# Patient Record
Sex: FEMALE | Race: OTHER | NOT HISPANIC OR LATINO | URBAN - METROPOLITAN AREA
[De-identification: names, ages, dates, MRNs, and addresses within clinical notes are randomized per-mention and may not be internally consistent; named-entity substitution may affect disease eponyms.]

---
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
## 2021-05-28 ENCOUNTER — APPOINTMENT (OUTPATIENT)
Dept: NEUROLOGY | Facility: CLINIC | Age: 21
End: 2021-05-28
Payer: COMMERCIAL

## 2021-05-28 VITALS
WEIGHT: 115 LBS | HEART RATE: 68 BPM | HEIGHT: 62 IN | DIASTOLIC BLOOD PRESSURE: 60 MMHG | SYSTOLIC BLOOD PRESSURE: 90 MMHG | BODY MASS INDEX: 21.16 KG/M2

## 2021-05-28 DIAGNOSIS — G43.709 CHRONIC MIGRAINE W/OUT AURA, NOT INTRACTABLE, W/OUT STATUS MIGRAINOSUS: ICD-10-CM

## 2021-05-28 DIAGNOSIS — Z82.0 FAMILY HISTORY OF EPILEPSY AND OTHER DISEASES OF THE NERVOUS SYSTEM: ICD-10-CM

## 2021-05-28 PROCEDURE — 99072 ADDL SUPL MATRL&STAF TM PHE: CPT

## 2021-05-28 PROCEDURE — 99215 OFFICE O/P EST HI 40 MIN: CPT

## 2021-05-28 NOTE — ASSESSMENT
[FreeTextEntry1] : Impression: Today's neurological exam is normal.  Cory's history is most consistent with chronic migraine.  When she was seen in September 2020 she was having brief binocular visual episodes suggesting migraine aura.  Those visual episodes have not recurred. \par \par Recommendations: Trial of naproxen in the form of Aleve 2 tablets twice daily for 5 days in attempt to break the headache cycle.  Could then take Aleve 1 to 2 tablets twice daily as needed for recurrent headache.  MRI of the brain noncontrast.  She was given literature regarding potential migraine triggers.  I did suggest she review the specific birth control medication she is receiving regarding  risk of migraine.  I would prefer to avoid daily preventive medication at this juncture.  Reassurance was given to the patient that today's exam is normal.\par

## 2021-05-28 NOTE — HISTORY OF PRESENT ILLNESS
[FreeTextEntry1] : Cory is seen for an office visit.  She was last seen on 9/17/2020 when she presented with chronic headache but also brief episodes of binocular visual loss.  Visual episodes have resolved.  Never did have the brain MRI which I had suggested at the time of the last visit.  She is now describing frequent bilateral predominantly frontotemporal headaches which occur on the average of 3 times a week for at least the last 1 year.  She takes over-the-counter analgesics specifically ibuprofen and does get temporary relief.  The headaches are more frequent with activity and there is relief with rest.  They are also more frequent during the time of menses.  She does take birth control medication.  She denies any visual aura or any focal neurological complaints.  She does have nausea with the headache.

## 2021-05-28 NOTE — CONSULT LETTER
[Dear  ___] : Dear  [unfilled], [Consult Letter:] : I had the pleasure of evaluating your patient, [unfilled]. [Please see my note below.] : Please see my note below. [Consult Closing:] : Thank you very much for allowing me to participate in the care of this patient.  If you have any questions, please do not hesitate to contact me. [Sincerely,] : Sincerely, [FreeTextEntry3] : Cristóbal Melo MD\par

## 2021-09-08 ENCOUNTER — NON-APPOINTMENT (OUTPATIENT)
Age: 21
End: 2021-09-08

## 2021-09-10 ENCOUNTER — APPOINTMENT (OUTPATIENT)
Dept: ORTHOPEDIC SURGERY | Facility: CLINIC | Age: 21
End: 2021-09-10
Payer: COMMERCIAL

## 2021-09-10 DIAGNOSIS — M54.5 LOW BACK PAIN: ICD-10-CM

## 2021-09-10 PROCEDURE — 72100 X-RAY EXAM L-S SPINE 2/3 VWS: CPT

## 2021-09-10 PROCEDURE — 99204 OFFICE O/P NEW MOD 45 MIN: CPT

## 2021-09-10 PROCEDURE — 99072 ADDL SUPL MATRL&STAF TM PHE: CPT

## 2021-10-14 DIAGNOSIS — H53.30 UNSPECIFIED DISORDER OF BINOCULAR VISION: ICD-10-CM

## 2021-10-15 ENCOUNTER — OUTPATIENT (OUTPATIENT)
Dept: OUTPATIENT SERVICES | Facility: HOSPITAL | Age: 21
LOS: 1 days | End: 2021-10-15
Payer: COMMERCIAL

## 2021-10-15 ENCOUNTER — APPOINTMENT (OUTPATIENT)
Dept: MRI IMAGING | Facility: HOSPITAL | Age: 21
End: 2021-10-15
Payer: COMMERCIAL

## 2021-10-15 DIAGNOSIS — M62.830 MUSCLE SPASM OF BACK: ICD-10-CM

## 2021-10-15 DIAGNOSIS — S33.5XXA SPRAIN OF LIGAMENTS OF LUMBAR SPINE, INITIAL ENCOUNTER: ICD-10-CM

## 2021-10-15 PROCEDURE — 72148 MRI LUMBAR SPINE W/O DYE: CPT | Mod: 26

## 2021-10-15 PROCEDURE — 72148 MRI LUMBAR SPINE W/O DYE: CPT

## 2021-11-01 ENCOUNTER — APPOINTMENT (OUTPATIENT)
Dept: ORTHOPEDIC SURGERY | Facility: CLINIC | Age: 21
End: 2021-11-01
Payer: COMMERCIAL

## 2021-11-01 DIAGNOSIS — S39.012A STRAIN OF MUSCLE, FASCIA AND TENDON OF LOWER BACK, INITIAL ENCOUNTER: ICD-10-CM

## 2021-11-01 PROCEDURE — 99072 ADDL SUPL MATRL&STAF TM PHE: CPT

## 2021-11-01 PROCEDURE — 99214 OFFICE O/P EST MOD 30 MIN: CPT

## 2021-11-01 RX ORDER — DICLOFENAC SODIUM 75 MG/1
75 TABLET, DELAYED RELEASE ORAL
Qty: 30 | Refills: 1 | Status: ACTIVE | COMMUNITY
Start: 2021-11-01 | End: 1900-01-01

## 2021-11-19 ENCOUNTER — OUTPATIENT (OUTPATIENT)
Dept: OUTPATIENT SERVICES | Facility: HOSPITAL | Age: 21
LOS: 1 days | End: 2021-11-19
Payer: COMMERCIAL

## 2021-11-19 ENCOUNTER — APPOINTMENT (OUTPATIENT)
Dept: MRI IMAGING | Facility: HOSPITAL | Age: 21
End: 2021-11-19
Payer: COMMERCIAL

## 2021-11-19 DIAGNOSIS — H53.30 UNSPECIFIED DISORDER OF BINOCULAR VISION: ICD-10-CM

## 2021-11-19 PROCEDURE — 70551 MRI BRAIN STEM W/O DYE: CPT | Mod: 26

## 2021-11-19 PROCEDURE — 70551 MRI BRAIN STEM W/O DYE: CPT

## 2021-11-22 ENCOUNTER — NON-APPOINTMENT (OUTPATIENT)
Age: 21
End: 2021-11-22

## 2021-11-28 ENCOUNTER — TRANSCRIPTION ENCOUNTER (OUTPATIENT)
Age: 21
End: 2021-11-28

## 2021-12-17 ENCOUNTER — TRANSCRIPTION ENCOUNTER (OUTPATIENT)
Age: 21
End: 2021-12-17

## 2022-08-14 ENCOUNTER — NON-APPOINTMENT (OUTPATIENT)
Age: 22
End: 2022-08-14

## 2022-11-11 ENCOUNTER — NON-APPOINTMENT (OUTPATIENT)
Age: 22
End: 2022-11-11

## 2022-12-20 ENCOUNTER — NON-APPOINTMENT (OUTPATIENT)
Age: 22
End: 2022-12-20

## 2023-03-03 ENCOUNTER — NON-APPOINTMENT (OUTPATIENT)
Age: 23
End: 2023-03-03

## 2023-03-30 ENCOUNTER — NON-APPOINTMENT (OUTPATIENT)
Age: 23
End: 2023-03-30

## 2023-03-30 DIAGNOSIS — N76.0 ACUTE VAGINITIS: ICD-10-CM

## 2023-03-30 DIAGNOSIS — Z92.89 PERSONAL HISTORY OF OTHER MEDICAL TREATMENT: ICD-10-CM

## 2023-03-30 DIAGNOSIS — Z11.3 ENCOUNTER FOR SCREENING FOR INFECTIONS WITH A PREDOMINANTLY SEXUAL MODE OF TRANSMISSION: ICD-10-CM

## 2023-11-27 ENCOUNTER — NON-APPOINTMENT (OUTPATIENT)
Age: 23
End: 2023-11-27

## 2023-12-08 ENCOUNTER — EMERGENCY (EMERGENCY)
Facility: HOSPITAL | Age: 23
LOS: 1 days | Discharge: HOME | End: 2023-12-08
Attending: EMERGENCY MEDICINE | Admitting: EMERGENCY MEDICINE
Payer: COMMERCIAL

## 2023-12-08 VITALS
TEMPERATURE: 99 F | SYSTOLIC BLOOD PRESSURE: 115 MMHG | OXYGEN SATURATION: 98 % | HEIGHT: 63 IN | WEIGHT: 130.07 LBS | RESPIRATION RATE: 20 BRPM | HEART RATE: 81 BPM | DIASTOLIC BLOOD PRESSURE: 77 MMHG

## 2023-12-08 PROCEDURE — 99285 EMERGENCY DEPT VISIT HI MDM: CPT

## 2023-12-08 PROCEDURE — 99053 MED SERV 10PM-8AM 24 HR FAC: CPT

## 2023-12-08 NOTE — ED ADULT TRIAGE NOTE - CHIEF COMPLAINT QUOTE
pt axo3, c/o abdominal pain. pt was told GI doctor to come to ED for elevated ALT, pt states last ALT was in the 600s. pt states she tested negative for hepatitis.

## 2023-12-08 NOTE — ED ADULT TRIAGE NOTE - HEIGHT IN CM
Prenatal Breastfeeding Education Toolkit provided for patient to review, helping her to make an informed decision on a feeding choice for her baby. Questions directed to the provider.  Complete.  Raina Dao on 10/30/2019 at 4:26 PM     160.02

## 2023-12-09 VITALS
HEART RATE: 82 BPM | SYSTOLIC BLOOD PRESSURE: 118 MMHG | RESPIRATION RATE: 17 BRPM | OXYGEN SATURATION: 98 % | DIASTOLIC BLOOD PRESSURE: 74 MMHG

## 2023-12-09 LAB
ALBUMIN SERPL ELPH-MCNC: 3.2 G/DL — LOW (ref 3.3–5)
ALBUMIN SERPL ELPH-MCNC: 3.2 G/DL — LOW (ref 3.3–5)
ALP SERPL-CCNC: 107 U/L — SIGNIFICANT CHANGE UP (ref 40–120)
ALP SERPL-CCNC: 107 U/L — SIGNIFICANT CHANGE UP (ref 40–120)
ALT FLD-CCNC: 707 U/L — HIGH (ref 10–45)
ALT FLD-CCNC: 707 U/L — HIGH (ref 10–45)
ANION GAP SERPL CALC-SCNC: 9 MMOL/L — SIGNIFICANT CHANGE UP (ref 5–17)
ANION GAP SERPL CALC-SCNC: 9 MMOL/L — SIGNIFICANT CHANGE UP (ref 5–17)
AST SERPL-CCNC: 335 U/L — HIGH (ref 10–40)
AST SERPL-CCNC: 335 U/L — HIGH (ref 10–40)
BASOPHILS # BLD AUTO: 0 K/UL — SIGNIFICANT CHANGE UP (ref 0–0.2)
BASOPHILS # BLD AUTO: 0 K/UL — SIGNIFICANT CHANGE UP (ref 0–0.2)
BASOPHILS NFR BLD AUTO: 0 % — SIGNIFICANT CHANGE UP (ref 0–2)
BASOPHILS NFR BLD AUTO: 0 % — SIGNIFICANT CHANGE UP (ref 0–2)
BILIRUB SERPL-MCNC: 0.4 MG/DL — SIGNIFICANT CHANGE UP (ref 0.2–1.2)
BILIRUB SERPL-MCNC: 0.4 MG/DL — SIGNIFICANT CHANGE UP (ref 0.2–1.2)
BUN SERPL-MCNC: 10 MG/DL — SIGNIFICANT CHANGE UP (ref 7–23)
BUN SERPL-MCNC: 10 MG/DL — SIGNIFICANT CHANGE UP (ref 7–23)
CALCIUM SERPL-MCNC: 8.3 MG/DL — LOW (ref 8.4–10.5)
CALCIUM SERPL-MCNC: 8.3 MG/DL — LOW (ref 8.4–10.5)
CHLORIDE SERPL-SCNC: 104 MMOL/L — SIGNIFICANT CHANGE UP (ref 96–108)
CHLORIDE SERPL-SCNC: 104 MMOL/L — SIGNIFICANT CHANGE UP (ref 96–108)
CO2 SERPL-SCNC: 24 MMOL/L — SIGNIFICANT CHANGE UP (ref 22–31)
CO2 SERPL-SCNC: 24 MMOL/L — SIGNIFICANT CHANGE UP (ref 22–31)
CREAT SERPL-MCNC: 0.57 MG/DL — SIGNIFICANT CHANGE UP (ref 0.5–1.3)
CREAT SERPL-MCNC: 0.57 MG/DL — SIGNIFICANT CHANGE UP (ref 0.5–1.3)
EGFR: 132 ML/MIN/1.73M2 — SIGNIFICANT CHANGE UP
EGFR: 132 ML/MIN/1.73M2 — SIGNIFICANT CHANGE UP
EOSINOPHIL # BLD AUTO: 0.21 K/UL — SIGNIFICANT CHANGE UP (ref 0–0.5)
EOSINOPHIL # BLD AUTO: 0.21 K/UL — SIGNIFICANT CHANGE UP (ref 0–0.5)
EOSINOPHIL NFR BLD AUTO: 3 % — SIGNIFICANT CHANGE UP (ref 0–6)
EOSINOPHIL NFR BLD AUTO: 3 % — SIGNIFICANT CHANGE UP (ref 0–6)
GLUCOSE SERPL-MCNC: 93 MG/DL — SIGNIFICANT CHANGE UP (ref 70–99)
GLUCOSE SERPL-MCNC: 93 MG/DL — SIGNIFICANT CHANGE UP (ref 70–99)
HCT VFR BLD CALC: 37.4 % — SIGNIFICANT CHANGE UP (ref 34.5–45)
HCT VFR BLD CALC: 37.4 % — SIGNIFICANT CHANGE UP (ref 34.5–45)
HGB BLD-MCNC: 13 G/DL — SIGNIFICANT CHANGE UP (ref 11.5–15.5)
HGB BLD-MCNC: 13 G/DL — SIGNIFICANT CHANGE UP (ref 11.5–15.5)
LIDOCAIN IGE QN: 70 U/L — SIGNIFICANT CHANGE UP (ref 16–77)
LIDOCAIN IGE QN: 70 U/L — SIGNIFICANT CHANGE UP (ref 16–77)
LYMPHOCYTES # BLD AUTO: 4.11 K/UL — HIGH (ref 1–3.3)
LYMPHOCYTES # BLD AUTO: 4.11 K/UL — HIGH (ref 1–3.3)
LYMPHOCYTES # BLD AUTO: 59 % — HIGH (ref 13–44)
LYMPHOCYTES # BLD AUTO: 59 % — HIGH (ref 13–44)
MCHC RBC-ENTMCNC: 29.7 PG — SIGNIFICANT CHANGE UP (ref 27–34)
MCHC RBC-ENTMCNC: 29.7 PG — SIGNIFICANT CHANGE UP (ref 27–34)
MCHC RBC-ENTMCNC: 34.8 GM/DL — SIGNIFICANT CHANGE UP (ref 32–36)
MCHC RBC-ENTMCNC: 34.8 GM/DL — SIGNIFICANT CHANGE UP (ref 32–36)
MCV RBC AUTO: 85.6 FL — SIGNIFICANT CHANGE UP (ref 80–100)
MCV RBC AUTO: 85.6 FL — SIGNIFICANT CHANGE UP (ref 80–100)
MONOCYTES # BLD AUTO: 0.21 K/UL — SIGNIFICANT CHANGE UP (ref 0–0.9)
MONOCYTES # BLD AUTO: 0.21 K/UL — SIGNIFICANT CHANGE UP (ref 0–0.9)
MONOCYTES NFR BLD AUTO: 3 % — SIGNIFICANT CHANGE UP (ref 2–14)
MONOCYTES NFR BLD AUTO: 3 % — SIGNIFICANT CHANGE UP (ref 2–14)
NEUTROPHILS # BLD AUTO: 2.02 K/UL — SIGNIFICANT CHANGE UP (ref 1.8–7.4)
NEUTROPHILS # BLD AUTO: 2.02 K/UL — SIGNIFICANT CHANGE UP (ref 1.8–7.4)
NEUTROPHILS NFR BLD AUTO: 29 % — LOW (ref 43–77)
NEUTROPHILS NFR BLD AUTO: 29 % — LOW (ref 43–77)
PLATELET # BLD AUTO: 202 K/UL — SIGNIFICANT CHANGE UP (ref 150–400)
PLATELET # BLD AUTO: 202 K/UL — SIGNIFICANT CHANGE UP (ref 150–400)
POTASSIUM SERPL-MCNC: 3.7 MMOL/L — SIGNIFICANT CHANGE UP (ref 3.5–5.3)
POTASSIUM SERPL-MCNC: 3.7 MMOL/L — SIGNIFICANT CHANGE UP (ref 3.5–5.3)
POTASSIUM SERPL-SCNC: 3.7 MMOL/L — SIGNIFICANT CHANGE UP (ref 3.5–5.3)
POTASSIUM SERPL-SCNC: 3.7 MMOL/L — SIGNIFICANT CHANGE UP (ref 3.5–5.3)
PROT SERPL-MCNC: 7.2 G/DL — SIGNIFICANT CHANGE UP (ref 6–8.3)
PROT SERPL-MCNC: 7.2 G/DL — SIGNIFICANT CHANGE UP (ref 6–8.3)
RBC # BLD: 4.37 M/UL — SIGNIFICANT CHANGE UP (ref 3.8–5.2)
RBC # BLD: 4.37 M/UL — SIGNIFICANT CHANGE UP (ref 3.8–5.2)
RBC # FLD: 16 % — HIGH (ref 10.3–14.5)
RBC # FLD: 16 % — HIGH (ref 10.3–14.5)
SODIUM SERPL-SCNC: 137 MMOL/L — SIGNIFICANT CHANGE UP (ref 135–145)
SODIUM SERPL-SCNC: 137 MMOL/L — SIGNIFICANT CHANGE UP (ref 135–145)
WBC # BLD: 6.97 K/UL — SIGNIFICANT CHANGE UP (ref 3.8–10.5)
WBC # BLD: 6.97 K/UL — SIGNIFICANT CHANGE UP (ref 3.8–10.5)
WBC # FLD AUTO: 6.97 K/UL — SIGNIFICANT CHANGE UP (ref 3.8–10.5)
WBC # FLD AUTO: 6.97 K/UL — SIGNIFICANT CHANGE UP (ref 3.8–10.5)

## 2023-12-09 PROCEDURE — 85025 COMPLETE CBC W/AUTO DIFF WBC: CPT

## 2023-12-09 PROCEDURE — 36415 COLL VENOUS BLD VENIPUNCTURE: CPT

## 2023-12-09 PROCEDURE — 99283 EMERGENCY DEPT VISIT LOW MDM: CPT

## 2023-12-09 PROCEDURE — 81025 URINE PREGNANCY TEST: CPT

## 2023-12-09 PROCEDURE — 80053 COMPREHEN METABOLIC PANEL: CPT

## 2023-12-09 PROCEDURE — 83690 ASSAY OF LIPASE: CPT

## 2023-12-09 NOTE — ED ADULT NURSE NOTE - NSFALLHARMRISKINTERV_ED_ALL_ED
Communicate risk of Fall with Harm to all staff, patient, and family/Provide visual cue: red socks, yellow wristband, yellow gown, etc/Reinforce activity limits and safety measures with patient and family/Bed in lowest position, wheels locked, appropriate side rails in place/Call bell, personal items and telephone in reach/Instruct patient to call for assistance before getting out of bed/chair/stretcher/Non-slip footwear applied when patient is off stretcher/[place] to call system/Physically safe environment - no spills, clutter or unnecessary equipment/Purposeful Proactive Rounding/Room/bathroom lighting operational, light cord in reach Communicate risk of Fall with Harm to all staff, patient, and family/Provide visual cue: red socks, yellow wristband, yellow gown, etc/Reinforce activity limits and safety measures with patient and family/Bed in lowest position, wheels locked, appropriate side rails in place/Call bell, personal items and telephone in reach/Instruct patient to call for assistance before getting out of bed/chair/stretcher/Non-slip footwear applied when patient is off stretcher/[place] to call system/Physically safe environment - no spills, clutter or unnecessary equipment/Purposeful Proactive Rounding/Room/bathroom lighting operational, light cord in reach

## 2023-12-09 NOTE — ED PROVIDER NOTE - NSICDXPASTMEDICALHX_GEN_ALL_CORE_FT
Called the pt to discuss test results and recommendations. I left the pt a VM asking for a call back.    PAST MEDICAL HISTORY:  H/O von Willebrand's disease

## 2023-12-09 NOTE — ED PROVIDER NOTE - CLINICAL SUMMARY MEDICAL DECISION MAKING FREE TEXT BOX
23-year-old female complaining of intermittent abdominal pains since November 25 with nausea, decreased appetite.  No fever or chills.  No vomiting diarrhea.  Was evaluated by her PMD and also with gastroenterology where they found patient to have elevated AST and ALT.  Labs reportedly increased,  2 days ago.  Labs are repeated again today, no results.  Patient also reportedly had negative hepatitis test.  Patient states her GI doctor told her to come to the hospital for further evaluation    Patient well-appearing, vitals unremarkable.  Nontender abdomen.  No jaundice.  Possible viral syndrome.  Will recheck labs, LFTs.  Patient reportedly also had CT abdomen pelvis with contrast today and was told by her gastroenterologist that it was negative.  Will not do additional imaging today unless worsening 23-year-old female complaining of intermittent abdominal pains since November 25 with nausea, decreased appetite.  No fever or chills.  No vomiting diarrhea.  Was evaluated by her PMD and also with gastroenterology where they found patient to have elevated AST and ALT.  Labs reportedly increased,  2 days ago.  Labs are repeated again today, no results.  Patient also reportedly had negative hepatitis test.  Patient states her GI doctor told her to come to the hospital for further evaluation    Patient well-appearing, vitals unremarkable.  Nontender abdomen.  No jaundice.  Possible viral syndrome.  Will recheck labs, LFTs.  Patient reportedly also had CT abdomen pelvis with contrast today and was told by her gastroenterologist that it was negative.  Will not do additional imaging today unless worsening clinically    pt with elevations ast/alt. no significant increase from prior. nontneder abd. case and results d/w GI Dr Slaughter, covering pt's GI Dr Ruiz: agree with dc. f/u this week

## 2023-12-09 NOTE — ED PROVIDER NOTE - NSFOLLOWUPINSTRUCTIONS_ED_ALL_ED_FT
-Follow-up with your gastroenterologist Dr. Keisha Ruiz regarding elevated liver tests ast/alt in the next 1-3 days  -Return for worse abdominal pain, uncontrolled vomiting, fevers, feeling faint or other concerns

## 2023-12-09 NOTE — ED PROVIDER NOTE - OBJECTIVE STATEMENT
23-year-old female complaining of intermittent abdominal pains since November 25 with nausea, decreased appetite.  No fever or chills.  No vomiting diarrhea.  Was evaluated by her PMD and also with gastroenterology where they found patient to have elevated AST and ALT.  Labs reportedly increased,  2 days ago.  Labs are repeated again today, no results.  Patient also reportedly had negative hepatitis test.  Patient states her GI doctor told her to come to the hospital for further evaluation

## 2023-12-09 NOTE — ED ADULT NURSE NOTE - NS ED NOTE ABUSE SUSPICION NEGLECT YN
As needed for nausea/vomiting.  Pepcid twice daily.  Carafate twice daily.  Continue with prenatal vitamins.  Drink lots of fluids if not eating as much.  Please follow-up with an OBGYN when you return to [place].  Over-the-counter Metamucil help supplement fiber.    Return to this ED if you begin with abdominal pain, vaginal bleeding, continued vomiting, if any other problems occur.   No

## 2023-12-09 NOTE — ED PROVIDER NOTE - PATIENT PORTAL LINK FT
You can access the FollowMyHealth Patient Portal offered by [facility] by registering at the following website: [email or web address]. By joining [employer]’s FollowMyHealth portal, you will also be able to view your health information using other applications (apps) compatible with our system. You can access the FollowMyHealth Patient Portal offered by [facility] by registering at the following website: [email or web address]. By joining [employer]’s FollowMyHealth portal, you will also be able to view your health information using other applications (apps) compatible with our system.

## 2023-12-09 NOTE — ED ADULT NURSE NOTE - OBJECTIVE STATEMENT
PT came into ED stating "I had blood work done on monday and my ALT was 476 then I had blood work done again on wednesday and my ALT was 674, My GI doctor told me to come in because of the 200point jump. I had a catscan done today and my GI doctor told me it was negative. I've been having abdominal pain for 2 weeks now with nausea and diarrhea and I also started having a bloody nose (3 in total). I also started a new birth control in October". Pt denies fever, chest pain, SOB

## 2023-12-09 NOTE — ED PROVIDER NOTE - PHYSICAL EXAMINATION
exam:   General: well appearing, NAD.   HEENT: eyes perrl, nose normal, OP no erythema/exudate/swelling.   cor: RRR, s1s2, 2+rad pulses.   lungs: ctabl, no resp distress.   abd: soft, ntnd.   neuro: a&ox3, cn2-12 intact, ALDRIDGE, 5/5 strength c nl sensation all extremities, nl coordination.   MSK: no extremity swelling.  Skin: normal, no rash. no jaundice. no icterus

## 2023-12-28 ENCOUNTER — NON-APPOINTMENT (OUTPATIENT)
Age: 23
End: 2023-12-28

## 2024-02-17 ENCOUNTER — OFFICE (OUTPATIENT)
Dept: URBAN - METROPOLITAN AREA CLINIC 12 | Facility: CLINIC | Age: 24
Setting detail: OPHTHALMOLOGY
End: 2024-02-17
Payer: COMMERCIAL

## 2024-02-17 DIAGNOSIS — H52.13: ICD-10-CM

## 2024-02-17 PROCEDURE — SCREF LASIK EVAL: Performed by: OPHTHALMOLOGY

## 2024-02-17 ASSESSMENT — REFRACTION_MANIFEST
OS_VA1: 20/15-1
OU_VA: 20/15
OS_VA1: 20/20
OD_SPHERE: -4.25
OS_AXIS: 080
OD_CYLINDER: -0.75
OS_AXIS: 175
OS_SPHERE: -5.25
OD_SPHERE: -5.25
OD_SPHERE: -5.00
OS_CYLINDER: SPHERE
OS_SPHERE: -4.75
OD_AXIS: 014
OS_SPHERE: -4.25
OD_CYLINDER: SPHERE
OD_VA1: 20/20
OS_CYLINDER: +0.50
OD_AXIS: 100
OD_VA1: 20/15
OD_CYLINDER: +0.25
OS_CYLINDER: -0.75

## 2024-02-17 ASSESSMENT — SPHEQUIV_DERIVED
OD_SPHEQUIV: -5.125
OD_SPHEQUIV: -5.375
OS_SPHEQUIV: -5
OS_SPHEQUIV: -5.125
OS_SPHEQUIV: -5.125
OD_SPHEQUIV: -5.375

## 2024-02-17 ASSESSMENT — REFRACTION_AUTOREFRACTION
OD_CYLINDER: -0.75
OS_AXIS: 175
OS_CYLINDER: -0.75
OS_SPHERE: -4.75
OD_AXIS: 014
OD_SPHERE: -5.00

## 2024-02-17 ASSESSMENT — REFRACTION_CURRENTRX
OD_VPRISM_DIRECTION: SV
OD_AXIS: 008
OS_SPHERE: -4.50
OD_SPHERE: -5.00
OS_VPRISM_DIRECTION: SV
OS_OVR_VA: 20/
OD_OVR_VA: 20/
OS_AXIS: 160
OS_CYLINDER: -0.75
OD_CYLINDER: -0.25

## 2024-02-17 ASSESSMENT — LID EXAM ASSESSMENTS
OD_BLEPHARITIS: 1+
OS_BLEPHARITIS: 1+

## 2024-02-17 ASSESSMENT — CONFRONTATIONAL VISUAL FIELD TEST (CVF)
OS_FINDINGS: FULL
OD_FINDINGS: FULL

## 2024-03-16 ENCOUNTER — OFFICE (OUTPATIENT)
Dept: URBAN - METROPOLITAN AREA CLINIC 12 | Facility: CLINIC | Age: 24
Setting detail: OPHTHALMOLOGY
End: 2024-03-16
Payer: COMMERCIAL

## 2024-03-16 DIAGNOSIS — H52.13: ICD-10-CM

## 2024-03-16 PROCEDURE — SCREF LASIK EVAL: Performed by: OPHTHALMOLOGY

## 2024-03-16 ASSESSMENT — SPHEQUIV_DERIVED
OD_SPHEQUIV: -5.375
OS_SPHEQUIV: -5.125
OD_SPHEQUIV: -5.25
OS_SPHEQUIV: -5
OS_SPHEQUIV: -5.125
OD_SPHEQUIV: -5.125

## 2024-03-16 ASSESSMENT — REFRACTION_MANIFEST
OS_AXIS: 080
OU_VA: 20/15
OD_SPHERE: -5.00
OS_CYLINDER: -0.75
OD_CYLINDER: -0.50
OD_CYLINDER: -0.75
OD_SPHERE: -5.00
OS_SPHERE: -5.25
OS_SPHERE: -4.75
OS_CYLINDER: +0.50
OS_CYLINDER: -0.75
OD_AXIS: 014
OS_VA1: 20/20
OD_AXIS: 100
OD_VA1: 20/20
OD_SPHERE: -5.25
OS_AXIS: 175
OD_AXIS: 014
OD_CYLINDER: +0.25
OD_VA1: 20/20
OS_AXIS: 173
OS_VA1: 20/20
OS_SPHERE: -4.75

## 2024-03-16 ASSESSMENT — LID EXAM ASSESSMENTS
OS_BLEPHARITIS: 1+
OD_BLEPHARITIS: 1+

## 2024-03-16 ASSESSMENT — REFRACTION_CURRENTRX
OD_SPHERE: -5.00
OD_AXIS: 021
OS_VPRISM_DIRECTION: SV
OD_OVR_VA: 20/
OS_AXIS: 171
OS_SPHERE: -4.75
OD_CYLINDER: -0.25
OD_VPRISM_DIRECTION: SV
OS_CYLINDER: -0.50
OS_OVR_VA: 20/

## 2024-03-21 ENCOUNTER — OTHER LOCATION (OUTPATIENT)
Dept: URBAN - METROPOLITAN AREA LASIK CENTER 6 | Facility: LASIK CENTER | Age: 24
Setting detail: OPHTHALMOLOGY
End: 2024-03-21

## 2024-03-21 DIAGNOSIS — H52.13: ICD-10-CM

## 2024-03-21 PROCEDURE — 65760 KERATOMILEUSIS: CPT | Mod: RT,LT | Performed by: OPHTHALMOLOGY

## 2024-03-22 ENCOUNTER — RX ONLY (RX ONLY)
Age: 24
End: 2024-03-22

## 2024-03-22 ENCOUNTER — OFFICE (OUTPATIENT)
Dept: URBAN - METROPOLITAN AREA CLINIC 12 | Facility: CLINIC | Age: 24
Setting detail: OPHTHALMOLOGY
End: 2024-03-22
Payer: COMMERCIAL

## 2024-03-22 DIAGNOSIS — H52.13: ICD-10-CM

## 2024-03-22 PROCEDURE — 99024 POSTOP FOLLOW-UP VISIT: CPT | Performed by: OPHTHALMOLOGY

## 2024-03-22 ASSESSMENT — REFRACTION_MANIFEST
OS_CYLINDER: -0.75
OS_VA1: 20/20
OS_SPHERE: -5.25
OD_CYLINDER: +0.25
OD_CYLINDER: -0.50
OD_AXIS: 014
OD_VA1: 20/20
OD_SPHERE: -5.00
OS_VA1: 20/20
OD_SPHERE: -5.25
OU_VA: 20/15
OD_VA1: 20/20
OD_AXIS: 100
OD_CYLINDER: -0.75
OS_CYLINDER: +0.50
OS_AXIS: 175
OS_CYLINDER: -0.75
OD_SPHERE: -5.00
OS_SPHERE: -4.75
OS_AXIS: 080
OD_AXIS: 014
OS_AXIS: 173
OS_SPHERE: -4.75

## 2024-03-22 ASSESSMENT — LID EXAM ASSESSMENTS
OS_BLEPHARITIS: 1+
OD_BLEPHARITIS: 1+

## 2024-03-22 ASSESSMENT — REFRACTION_CURRENTRX
OS_SPHERE: -4.75
OD_VPRISM_DIRECTION: SV
OS_CYLINDER: -0.50
OD_AXIS: 021
OS_VPRISM_DIRECTION: SV
OS_OVR_VA: 20/
OD_CYLINDER: -0.25
OS_AXIS: 171
OD_OVR_VA: 20/
OD_SPHERE: -5.00

## 2024-03-22 ASSESSMENT — SPHEQUIV_DERIVED
OS_SPHEQUIV: -5.125
OS_SPHEQUIV: -5.125
OS_SPHEQUIV: -5
OD_SPHEQUIV: -5.375
OD_SPHEQUIV: -5.25
OD_SPHEQUIV: -5.125

## 2024-03-29 ENCOUNTER — OFFICE (OUTPATIENT)
Dept: URBAN - METROPOLITAN AREA CLINIC 34 | Facility: CLINIC | Age: 24
Setting detail: OPHTHALMOLOGY
End: 2024-03-29
Payer: COMMERCIAL

## 2024-03-29 DIAGNOSIS — H52.13: ICD-10-CM

## 2024-03-29 PROCEDURE — 99024 POSTOP FOLLOW-UP VISIT: CPT | Performed by: OPHTHALMOLOGY

## 2024-03-29 ASSESSMENT — SPHEQUIV_DERIVED
OS_SPHEQUIV: -5.125
OS_SPHEQUIV: -5
OD_SPHEQUIV: -5.375
OD_SPHEQUIV: -5.25
OD_SPHEQUIV: -5.125
OS_SPHEQUIV: -5.125

## 2024-03-29 ASSESSMENT — REFRACTION_CURRENTRX
OD_CYLINDER: -0.25
OS_CYLINDER: -0.50
OS_AXIS: 171
OS_VPRISM_DIRECTION: SV
OD_OVR_VA: 20/
OS_OVR_VA: 20/
OD_AXIS: 021
OD_VPRISM_DIRECTION: SV
OD_SPHERE: -5.00
OS_SPHERE: -4.75

## 2024-03-29 ASSESSMENT — REFRACTION_MANIFEST
OD_AXIS: 014
OD_CYLINDER: +0.25
OS_CYLINDER: -0.75
OS_AXIS: 173
OD_AXIS: 100
OD_AXIS: 014
OD_VA1: 20/20
OS_CYLINDER: -0.75
OS_SPHERE: -4.75
OS_CYLINDER: +0.50
OD_SPHERE: -5.25
OD_SPHERE: -5.00
OD_VA1: 20/20
OS_AXIS: 175
OS_VA1: 20/20
OS_SPHERE: -4.75
OS_SPHERE: -5.25
OD_CYLINDER: -0.75
OS_VA1: 20/20
OD_SPHERE: -5.00
OU_VA: 20/15
OS_AXIS: 080
OD_CYLINDER: -0.50

## 2024-04-26 ENCOUNTER — OFFICE (OUTPATIENT)
Dept: URBAN - METROPOLITAN AREA CLINIC 35 | Facility: CLINIC | Age: 24
Setting detail: OPHTHALMOLOGY
End: 2024-04-26
Payer: COMMERCIAL

## 2024-04-26 DIAGNOSIS — H52.13: ICD-10-CM

## 2024-04-26 PROCEDURE — 99024 POSTOP FOLLOW-UP VISIT: CPT | Performed by: OPHTHALMOLOGY

## 2024-04-26 ASSESSMENT — LID EXAM ASSESSMENTS
OS_BLEPHARITIS: 1+
OD_BLEPHARITIS: 1+